# Patient Record
Sex: MALE | Race: WHITE | NOT HISPANIC OR LATINO | URBAN - NONMETROPOLITAN AREA
[De-identification: names, ages, dates, MRNs, and addresses within clinical notes are randomized per-mention and may not be internally consistent; named-entity substitution may affect disease eponyms.]

---

## 2017-12-05 ENCOUNTER — OFFICE VISIT (OUTPATIENT)
Dept: FAMILY MEDICINE CLINIC | Facility: CLINIC | Age: 42
End: 2017-12-05

## 2017-12-05 VITALS
WEIGHT: 195.4 LBS | HEIGHT: 69 IN | BODY MASS INDEX: 28.94 KG/M2 | HEART RATE: 51 BPM | DIASTOLIC BLOOD PRESSURE: 70 MMHG | SYSTOLIC BLOOD PRESSURE: 132 MMHG | OXYGEN SATURATION: 99 %

## 2017-12-05 DIAGNOSIS — R07.9 RECURRENT CHEST PAIN: Primary | ICD-10-CM

## 2017-12-05 DIAGNOSIS — Z00.00 PREVENTATIVE HEALTH CARE: ICD-10-CM

## 2017-12-05 DIAGNOSIS — F43.9 STRESS: ICD-10-CM

## 2017-12-05 PROCEDURE — 93000 ELECTROCARDIOGRAM COMPLETE: CPT | Performed by: INTERNAL MEDICINE

## 2017-12-05 PROCEDURE — 99213 OFFICE O/P EST LOW 20 MIN: CPT | Performed by: INTERNAL MEDICINE

## 2017-12-05 RX ORDER — NITROGLYCERIN 0.4 MG/1
TABLET SUBLINGUAL
Refills: 0 | COMMUNITY
Start: 2017-11-21

## 2017-12-05 RX ORDER — ASPIRIN 81 MG/1
81 TABLET ORAL DAILY
COMMUNITY

## 2017-12-05 NOTE — PROGRESS NOTES
Subjective   Isac Marcano is a 42 y.o. male.     Chief Complaint   Patient presents with   • Establish Care     [place] er f/u chest pain        History of Present Illness     Pt in to Establish his care and f/u from ER for chest pains.     Pt says there has been 3 or 4 episodes of chest pains that he had to put pressure on his chest to make the pain go away.  He did go to the hospital, he was checked out and now f/u on it.    This started on his day off about a month ago.  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  He felt a subtle feeling that was concerning that at first he thought was just a burp but then found himself laying on the floor from the pain.  He denies any pain radiating to any other parts and denies any sweating, but lasted about 15 minutes.  That first time, he didn't do anything and just went on.    The second time the chest pain happened, was when he was at work in a meeting.  The chest pain came on slowly and gradually and says while he was sitting at the table he pushed his arms to his chest and leaned his chest against the table to have the pressure.  This happened about 2 weeks ago.  Both cases lasted about 15 minutes and did cause some SOB.  He then went to ER when he had this episode and was told that from the tests he hadn't had a heart attack yet, but that doesn't mean he can't.  He was put on a baby aspirin and given Nitro and says the day after he was given the Nitro he took one and the pain went away, but he isn't sure if the pain just went away or if it was taken away from the Nitro.     Pt says he has no other conditions that he knows of, but admits to smoking, occasionally alcohol consumption, pt admits to some marijuana usage but no other illegal drugs.     Mother is alive and well and in her 60's.  Father is  but was in a car accident.     The following  portions of the patient's history were reviewed and updated as appropriate: allergies, current medications, past family history, past medical history, past social history, past surgical history and problem list.    Review of Systems   Constitutional: Negative for activity change, appetite change, fatigue and unexpected weight change.   HENT: Negative for ear pain, facial swelling and hearing loss.    Eyes: Negative for discharge and visual disturbance.   Respiratory: Positive for shortness of breath (when chest pain happens). Negative for cough and wheezing.    Cardiovascular: Positive for chest pain (Not all the time, but is recurrent). Negative for palpitations and leg swelling.   Gastrointestinal: Negative for abdominal pain.   Genitourinary: Negative for difficulty urinating, dysuria, flank pain, frequency, hematuria and urgency.   Musculoskeletal: Negative for joint swelling and myalgias.   Skin: Negative for color change and rash.   Allergic/Immunologic: Negative for food allergies.   Neurological: Negative for dizziness, syncope, weakness, numbness and headaches.   Hematological: Negative for adenopathy.   Psychiatric/Behavioral: Negative for confusion, decreased concentration, dysphoric mood, hallucinations, self-injury, sleep disturbance and suicidal ideas. The patient is nervous/anxious.    All other systems reviewed and are negative.      Objective   Physical Exam   Constitutional: He is oriented to person, place, and time. Vital signs are normal. He appears well-developed and well-nourished. No distress.   HENT:   Head: Normocephalic and atraumatic.   Right Ear: External ear normal.   Left Ear: External ear normal.   Nose: Nose normal.   Eyes: Conjunctivae and EOM are normal. Pupils are equal, round, and reactive to light.   Neck: Normal range of motion. Neck supple.   Cardiovascular: Normal rate and regular rhythm.  Exam reveals no gallop and no friction rub.    No murmur heard.  Pulmonary/Chest: Effort  normal and breath sounds normal. No respiratory distress. He has no wheezes. He has no rales.   Abdominal: Soft. He exhibits no distension. There is no tenderness. There is no rebound and no guarding.   Musculoskeletal: Normal range of motion. He exhibits tenderness. He exhibits no edema.   Neurological: He is alert and oriented to person, place, and time. No cranial nerve deficit.   Skin: Skin is warm and dry. No rash noted.   Psychiatric: He has a normal mood and affect. His speech is normal and behavior is normal. Judgment and thought content normal. His mood appears not anxious. He is not agitated and not aggressive. Thought content is not paranoid and not delusional. Cognition and memory are normal. He does not exhibit a depressed mood. He expresses no homicidal and no suicidal ideation. He expresses no suicidal plans and no homicidal plans.   Nursing note and vitals reviewed.      Assessment/Plan   Isac was seen today for establish care.    Diagnoses and all orders for this visit:    Recurrent chest pain  Comments:  Ongoing    Stress  Comments:  Ongoing        STAT    EKG - for recurrent chest pains with 2 episodes    Schedule Cardiology with [facility] for chest pains    Labs due: CBC CMP Lipids Thyroids  RTC 2 weeks after labs are done to review them.   (For male patient get PSA if over the age of 50 and not over age of 80.)     Scribed for Dr. Lopez by Vera Christy 12/05/17